# Patient Record
Sex: MALE | Race: ASIAN | NOT HISPANIC OR LATINO | Employment: FULL TIME | URBAN - METROPOLITAN AREA
[De-identification: names, ages, dates, MRNs, and addresses within clinical notes are randomized per-mention and may not be internally consistent; named-entity substitution may affect disease eponyms.]

---

## 2022-07-25 ENCOUNTER — LAB (OUTPATIENT)
Dept: LAB | Facility: CLINIC | Age: 37
End: 2022-07-25
Payer: COMMERCIAL

## 2022-07-25 DIAGNOSIS — Z20.822 ENCOUNTER FOR LABORATORY TESTING FOR COVID-19 VIRUS: ICD-10-CM

## 2022-07-25 PROCEDURE — U0003 INFECTIOUS AGENT DETECTION BY NUCLEIC ACID (DNA OR RNA); SEVERE ACUTE RESPIRATORY SYNDROME CORONAVIRUS 2 (SARS-COV-2) (CORONAVIRUS DISEASE [COVID-19]), AMPLIFIED PROBE TECHNIQUE, MAKING USE OF HIGH THROUGHPUT TECHNOLOGIES AS DESCRIBED BY CMS-2020-01-R: HCPCS

## 2022-07-25 PROCEDURE — U0005 INFEC AGEN DETEC AMPLI PROBE: HCPCS

## 2022-07-26 LAB — SARS-COV-2 RNA RESP QL NAA+PROBE: NEGATIVE

## 2022-10-03 ENCOUNTER — HEALTH MAINTENANCE LETTER (OUTPATIENT)
Age: 37
End: 2022-10-03

## 2023-09-11 ASSESSMENT — ENCOUNTER SYMPTOMS
COUGH: 0
EYE PAIN: 0
PARESTHESIAS: 1
CONSTIPATION: 1
PALPITATIONS: 0
DIZZINESS: 0
DIARRHEA: 0
WEAKNESS: 0
HEADACHES: 0
HEARTBURN: 0
NAUSEA: 0
CHILLS: 0
FEVER: 0
MYALGIAS: 1
SORE THROAT: 0
SHORTNESS OF BREATH: 0
ARTHRALGIAS: 0
JOINT SWELLING: 0
DYSURIA: 0
HEMATURIA: 0
NERVOUS/ANXIOUS: 0
ABDOMINAL PAIN: 0
HEMATOCHEZIA: 0
FREQUENCY: 0

## 2023-09-12 ENCOUNTER — TELEPHONE (OUTPATIENT)
Dept: DERMATOLOGY | Facility: CLINIC | Age: 38
End: 2023-09-12

## 2023-09-12 ENCOUNTER — OFFICE VISIT (OUTPATIENT)
Dept: FAMILY MEDICINE | Facility: CLINIC | Age: 38
End: 2023-09-12
Payer: COMMERCIAL

## 2023-09-12 VITALS
RESPIRATION RATE: 16 BRPM | DIASTOLIC BLOOD PRESSURE: 79 MMHG | WEIGHT: 154 LBS | HEIGHT: 65 IN | SYSTOLIC BLOOD PRESSURE: 117 MMHG | BODY MASS INDEX: 25.66 KG/M2 | HEART RATE: 54 BPM | TEMPERATURE: 98.2 F | OXYGEN SATURATION: 99 %

## 2023-09-12 DIAGNOSIS — Z11.4 SCREENING FOR HIV (HUMAN IMMUNODEFICIENCY VIRUS): ICD-10-CM

## 2023-09-12 DIAGNOSIS — Z23 NEED FOR VACCINATION: ICD-10-CM

## 2023-09-12 DIAGNOSIS — D22.9 NUMEROUS SKIN MOLES: ICD-10-CM

## 2023-09-12 DIAGNOSIS — Z11.59 NEED FOR HEPATITIS C SCREENING TEST: ICD-10-CM

## 2023-09-12 DIAGNOSIS — D22.9 MULTIPLE ATYPICAL SKIN MOLES: ICD-10-CM

## 2023-09-12 DIAGNOSIS — Z23 NEED FOR HEPATITIS B VACCINATION: ICD-10-CM

## 2023-09-12 DIAGNOSIS — J30.2 SEASONAL ALLERGIC RHINITIS, UNSPECIFIED TRIGGER: ICD-10-CM

## 2023-09-12 DIAGNOSIS — Z00.00 ENCOUNTER FOR ANNUAL PHYSICAL EXAM: Primary | ICD-10-CM

## 2023-09-12 DIAGNOSIS — Z22.7 LTBI (LATENT TUBERCULOSIS INFECTION): ICD-10-CM

## 2023-09-12 DIAGNOSIS — Z01.84 IMMUNITY STATUS TESTING: ICD-10-CM

## 2023-09-12 LAB
ALBUMIN SERPL BCG-MCNC: 4.8 G/DL (ref 3.5–5.2)
ALP SERPL-CCNC: 51 U/L (ref 40–129)
ALT SERPL W P-5'-P-CCNC: 20 U/L (ref 0–70)
ANION GAP SERPL CALCULATED.3IONS-SCNC: 10 MMOL/L (ref 7–15)
AST SERPL W P-5'-P-CCNC: 17 U/L (ref 0–45)
BASOPHILS # BLD AUTO: 0 10E3/UL (ref 0–0.2)
BASOPHILS NFR BLD AUTO: 1 %
BILIRUB DIRECT SERPL-MCNC: 0.25 MG/DL (ref 0–0.3)
BILIRUB SERPL-MCNC: 1.1 MG/DL
BUN SERPL-MCNC: 13.7 MG/DL (ref 6–20)
CALCIUM SERPL-MCNC: 9.6 MG/DL (ref 8.6–10)
CHLORIDE SERPL-SCNC: 103 MMOL/L (ref 98–107)
CHOLEST SERPL-MCNC: 161 MG/DL
CREAT SERPL-MCNC: 0.88 MG/DL (ref 0.67–1.17)
DEPRECATED HCO3 PLAS-SCNC: 27 MMOL/L (ref 22–29)
EGFRCR SERPLBLD CKD-EPI 2021: >90 ML/MIN/1.73M2
EOSINOPHIL # BLD AUTO: 0.2 10E3/UL (ref 0–0.7)
EOSINOPHIL NFR BLD AUTO: 3 %
ERYTHROCYTE [DISTWIDTH] IN BLOOD BY AUTOMATED COUNT: 11.7 % (ref 10–15)
GLUCOSE SERPL-MCNC: 91 MG/DL (ref 70–99)
HCT VFR BLD AUTO: 46.6 % (ref 40–53)
HCV AB SERPL QL IA: NONREACTIVE
HDLC SERPL-MCNC: 45 MG/DL
HGB BLD-MCNC: 15.7 G/DL (ref 13.3–17.7)
HIV 1+2 AB+HIV1 P24 AG SERPL QL IA: NONREACTIVE
IMM GRANULOCYTES # BLD: 0 10E3/UL
IMM GRANULOCYTES NFR BLD: 0 %
LDLC SERPL CALC-MCNC: 100 MG/DL
LYMPHOCYTES # BLD AUTO: 1.9 10E3/UL (ref 0.8–5.3)
LYMPHOCYTES NFR BLD AUTO: 38 %
MCH RBC QN AUTO: 31.6 PG (ref 26.5–33)
MCHC RBC AUTO-ENTMCNC: 33.7 G/DL (ref 31.5–36.5)
MCV RBC AUTO: 94 FL (ref 78–100)
MONOCYTES # BLD AUTO: 0.4 10E3/UL (ref 0–1.3)
MONOCYTES NFR BLD AUTO: 8 %
NEUTROPHILS # BLD AUTO: 2.5 10E3/UL (ref 1.6–8.3)
NEUTROPHILS NFR BLD AUTO: 51 %
NONHDLC SERPL-MCNC: 116 MG/DL
PLATELET # BLD AUTO: 209 10E3/UL (ref 150–450)
POTASSIUM SERPL-SCNC: 4.3 MMOL/L (ref 3.4–5.3)
PROT SERPL-MCNC: 7.4 G/DL (ref 6.4–8.3)
RBC # BLD AUTO: 4.97 10E6/UL (ref 4.4–5.9)
SODIUM SERPL-SCNC: 140 MMOL/L (ref 136–145)
TRIGL SERPL-MCNC: 81 MG/DL
TSH SERPL DL<=0.005 MIU/L-ACNC: 2.09 UIU/ML (ref 0.3–4.2)
WBC # BLD AUTO: 5 10E3/UL (ref 4–11)

## 2023-09-12 PROCEDURE — 99386 PREV VISIT NEW AGE 40-64: CPT | Mod: 25 | Performed by: FAMILY MEDICINE

## 2023-09-12 PROCEDURE — 90471 IMMUNIZATION ADMIN: CPT | Performed by: FAMILY MEDICINE

## 2023-09-12 PROCEDURE — 80053 COMPREHEN METABOLIC PANEL: CPT | Performed by: FAMILY MEDICINE

## 2023-09-12 PROCEDURE — 86706 HEP B SURFACE ANTIBODY: CPT | Performed by: FAMILY MEDICINE

## 2023-09-12 PROCEDURE — 36415 COLL VENOUS BLD VENIPUNCTURE: CPT | Performed by: FAMILY MEDICINE

## 2023-09-12 PROCEDURE — 0121A COVID-19 BIVALENT 12+ (PFIZER): CPT | Performed by: FAMILY MEDICINE

## 2023-09-12 PROCEDURE — 86803 HEPATITIS C AB TEST: CPT | Performed by: FAMILY MEDICINE

## 2023-09-12 PROCEDURE — 80061 LIPID PANEL: CPT | Performed by: FAMILY MEDICINE

## 2023-09-12 PROCEDURE — 99213 OFFICE O/P EST LOW 20 MIN: CPT | Mod: 25 | Performed by: FAMILY MEDICINE

## 2023-09-12 PROCEDURE — 82248 BILIRUBIN DIRECT: CPT | Performed by: FAMILY MEDICINE

## 2023-09-12 PROCEDURE — 90714 TD VACC NO PRESV 7 YRS+ IM: CPT | Performed by: FAMILY MEDICINE

## 2023-09-12 PROCEDURE — 90686 IIV4 VACC NO PRSV 0.5 ML IM: CPT | Performed by: FAMILY MEDICINE

## 2023-09-12 PROCEDURE — 91312 COVID-19 BIVALENT 12+ (PFIZER): CPT | Performed by: FAMILY MEDICINE

## 2023-09-12 PROCEDURE — 85025 COMPLETE CBC W/AUTO DIFF WBC: CPT | Performed by: FAMILY MEDICINE

## 2023-09-12 PROCEDURE — 87389 HIV-1 AG W/HIV-1&-2 AB AG IA: CPT | Performed by: FAMILY MEDICINE

## 2023-09-12 PROCEDURE — 84443 ASSAY THYROID STIM HORMONE: CPT | Performed by: FAMILY MEDICINE

## 2023-09-12 PROCEDURE — 90472 IMMUNIZATION ADMIN EACH ADD: CPT | Performed by: FAMILY MEDICINE

## 2023-09-12 RX ORDER — FLUTICASONE PROPIONATE 50 MCG
1 SPRAY, SUSPENSION (ML) NASAL DAILY PRN
Qty: 9.9 ML | Refills: 11 | Status: SHIPPED | OUTPATIENT
Start: 2023-09-12

## 2023-09-12 RX ORDER — LATANOPROST 50 UG/ML
1 SOLUTION/ DROPS OPHTHALMIC DAILY
COMMUNITY
Start: 2015-09-10

## 2023-09-12 ASSESSMENT — ENCOUNTER SYMPTOMS
HEARTBURN: 0
ABDOMINAL PAIN: 0
PARESTHESIAS: 1
WEAKNESS: 0
MYALGIAS: 1
SORE THROAT: 0
NERVOUS/ANXIOUS: 0
COUGH: 0
DIARRHEA: 0
DIZZINESS: 0
NAUSEA: 0
PALPITATIONS: 0
FEVER: 0
FREQUENCY: 0
ARTHRALGIAS: 0
HEMATOCHEZIA: 0
CHILLS: 0
EYE PAIN: 0
DYSURIA: 0
CONSTIPATION: 1
SHORTNESS OF BREATH: 0
HEADACHES: 0
HEMATURIA: 0
JOINT SWELLING: 0

## 2023-09-12 NOTE — TELEPHONE ENCOUNTER
This encounter is being sent to inform the clinic that this patient has a referral from Arnol Chen MD in [facility]/OB  for the diagnoses of Numerous skin moles, Multiple atypical skin moles and has requested that this patient be seen within ASAP. Based on the availability of our provider(s), we are unable to accommodate this request.    Were all sites offered this patient?  Yes  Does scheduling algorithm request to schedule next available?  Patient has been scheduled for the first available opening with Axel Manuel MD on 4/9/24.  We have informed the patient that the clinic will review their referral and reach out if a sooner appointment is medically necessary.

## 2023-09-12 NOTE — PROGRESS NOTES
SUBJECTIVE:   CC: Alexander is an 38 year old who presents for preventative health visit.       9/12/2023     9:50 AM   Additional Questions   Roomed by Simin MARTINI         9/12/2023     9:50 AM   Patient Reported Additional Medications   Patient reports taking the following new medications Xalantan Eye drops       Healthy Habits:     Getting at least 3 servings of Calcium per day:  Yes    Bi-annual eye exam:  Yes    Dental care twice a year:  Yes    Sleep apnea or symptoms of sleep apnea:  None    Diet:  Regular (no restrictions)    Frequency of exercise:  1 day/week    Duration of exercise:  15-30 minutes    Taking medications regularly:  Yes    Medication side effects:  None    Additional concerns today:  Yes      Today's PHQ-2 Score:       9/11/2023     2:28 PM   PHQ-2 ( 1999 Pfizer)   Q1: Little interest or pleasure in doing things 0   Q2: Feeling down, depressed or hopeless 0   PHQ-2 Score 0   Q1: Little interest or pleasure in doing things Not at all   Q2: Feeling down, depressed or hopeless Not at all   PHQ-2 Score 0       He moved to [place] from [place] 3 years ago, but hasn't seen anyone since. So he decided to come in to get checked. Some of his friends became sick and so he wants to get checked. The friend got cancer in the neck.     He just came back from [place]. He is wondering about TB testing. Some of his family members in [place] had been diagnosed with TB. He had a positive TB test in 2017 and was treated with two medications for 6 months. He was not having symptoms at that time. Discussed monitoring for symptoms, CXR.  Plan to monitor at this time.     His wife has a cough that started a week ago. Wife noted to have LTBI in the past as well.     Moles: noted on the arm and body. New lesions noted, though are small, mostly. No pain, redness, swelling, or other changes noted.     Chest pain: he notes this sometimes when he wakes up or turns wrong. The pain is in the middle of the chest. The pain lasts for  several seconds to 1-2 minutes and then he feels better. No pain with exertion noted. He experiences the pain 1-2 times a month. No patterns noted with activities/diet before the chest pain comes on.     Allergic rhinitis: noted during the spring/fall with season changes. Used zyrtec in the past and didn't help a lot. He had allergy testing when was in his 20's.     Itching noted in the groin. Mild redness. No pain. Groin can be moist and hot. Discussed conservative management with hygiene and drying agents.     -Reviewed healthy eating and exercise.  -Skin cancer screening: No concerning skin changes expressed by patient.  -Smoking status:   Tobacco Use      Smoking status: Never      Smokeless tobacco: Never      -Family history: CAD, DM: none  HTN, atherosclerosis: father (66 years old now)  Colon cancer: PGM (diagnosed at age 73 years)    Family History   Problem Relation Age of Onset    Hypertension Father     Other - See Comments Father         Atherosclerosis    Colon Cancer Paternal Grandfather 75       Preventative health recommendations, evaluation options, and risk/benefits of each were discussed with patient. Accepted recommendations were ordered. Otherwise, patient declined.  Health Maintenance Due   Topic Date Due    YEARLY PREVENTIVE VISIT  Never done    ANNUAL REVIEW OF HM ORDERS  Never done    ADVANCE CARE PLANNING  Never done    HEPATITIS B IMMUNIZATION (1 of 3 - 3-dose series) Never done    HIV SCREENING  Never done    HEPATITIS C SCREENING  Never done    DTAP/TDAP/TD IMMUNIZATION (1 - Tdap) Never done    LIPID  Never done    COVID-19 Vaccine (4 - Moderna series) 02/01/2022    INFLUENZA VACCINE (1) Never done     Last Td booster: uncertain. Likely more than 5-10 years ago. No other records avaiable for review. Reviewed general recommendations, risks, benefits. Plan for Td booster today.   Hep B: uncertain. Reviewed immunity testing.   He gets flu shot annually.     -Immunizations due were  reviewed.    Immunization History   Administered Date(s) Administered    COVID-19 Bivalent 12+ (Pfizer) 09/12/2023    COVID-19 MONOVALENT 12+ (Pfizer) 12/07/2021    COVID-19 Monovalent 18+ (Moderna) 04/20/2021, 05/18/2021    Influenza Vaccine >6 months (Alfuria,Fluzone) 09/12/2023    TD,PF 7+ (Tenivac) 09/12/2023       -Labs: Laboratory recommendations reviewed with patient.            Social History     Tobacco Use    Smoking status: Never     Passive exposure: Never    Smokeless tobacco: Never   Substance Use Topics    Alcohol use: Not on file           9/11/2023     2:28 PM   Alcohol Use   Prescreen: >3 drinks/day or >7 drinks/week? No          No data to display                Last PSA: No results found for: PSA    Reviewed orders with patient. Reviewed health maintenance and updated orders accordingly - Yes    Reviewed and updated as needed this visit by clinical staff   Tobacco  Allergies  Meds     [facility]          Reviewed and updated as needed this visit by Provider         Jamal Frost         No past medical history on file.   No past surgical history on file.  No previous surgery.       Review of Systems   Constitutional:  Negative for chills and fever.   HENT:  Negative for congestion, ear pain, hearing loss and sore throat.    Eyes:  Negative for pain and visual disturbance.   Respiratory:  Negative for cough and shortness of breath.    Cardiovascular:  Positive for chest pain. Negative for palpitations and peripheral edema.   Gastrointestinal:  Positive for constipation. Negative for abdominal pain, diarrhea, heartburn, hematochezia and nausea.   Genitourinary:  Negative for dysuria, frequency, genital sores, hematuria, impotence, penile discharge and urgency.   Musculoskeletal:  Positive for myalgias. Negative for arthralgias and joint swelling.   Skin:  Negative for rash.   Neurological:  Positive for paresthesias. Negative for dizziness, weakness and headaches.   Psychiatric/Behavioral:  Negative for  "mood changes. The patient is not nervous/anxious.        OBJECTIVE:   /79   Pulse 54   Temp 98.2  F (36.8  C) (Oral)   Resp 16   Ht 1.638 m (5' 4.5\")   Wt 69.9 kg (154 lb)   SpO2 99%   BMI 26.03 kg/m      Physical Exam  GENERAL: healthy, alert and no distress  EYES: Eyes grossly normal to inspection, PERRL and conjunctivae and sclerae normal  HENT: ear canals and TM's normal, nose and mouth without ulcers or lesions  NECK: no adenopathy, no asymmetry, masses, or scars and thyroid normal to palpation  RESP: lungs clear to auscultation - no rales, rhonchi or wheezes  CV: regular rate and rhythm, normal S1 S2, no S3 or S4, no murmur, click or rub, no peripheral edema and peripheral pulses strong  ABDOMEN: soft, nontender, no hepatosplenomegaly, no masses and bowel sounds normal  : mild erythema of the groin/perineal regions. Otherwise normal skin.   MS: no gross musculoskeletal defects noted, no edema  SKIN: Multiple nevi on arms, body. Largest lesions are on the right arm and is about 1.5cm in diameter with hair growing out of it. Otherwise, no suspicious lesions or rashes  NEURO: Normal strength and tone, mentation intact and speech normal  PSYCH: mentation appears normal, affect normal/bright    Diagnostic Test Results:  Labs reviewed in Epic    ASSESSMENT/PLAN:     Patient Instructions:    -You are doing great.  -Continue to eat well.  Try to increase your servings of calcium as this can help your bones stay strong and healthy.  Follow a nutrition plan patient fruits and vegetables and low in fats and cholesterol.    -Be sure to eat 5-7 servings of fruits and vegetables each day.  -Find ways to stay active.  Try to get 150 minutes of moderate activity (where you are breathing faster and slightly sweating) each week.  -Try to maintain a body mass index (BMI) of 18.5-25 as this is considered a healthier weight range.  -Brush your teeth twice daily.  See a dentist every 6-12 months.  -Be sure to use " sunblock with SPF 15 or greater when going outside for extended periods of time.  Sunblock should be used even when it is a cloudy day.  Do intermittent skin checks for any concerning skin changes.  Wearing a wide brimmed hat and sunglasses can also be helpful to protect your skin from the sun.  -Monitor for any abnormal skin changes (such as new moles/spots, painful moles, changes in your old moles, wounds that will not heal, multiple colors noted in one lesion, lesions that are asymmetric or not circular, or anything that is concerning for you). If any of these are noted, please schedule an appointment to be seen.     -It is generally recommended for you to complete a health care directive or living will. These documents will be able to reflect your wishes and desire in the case that you are unable to express them yourself. Please let Dr. Chen know if you would like some assistance with this process.  -Review the packet that was given to you and complete it when you are ready.  Please bring a copy to the clinic to be scanned into the chart when you are ready.    -You were referred to the skin specialist for the moles.  -If you do not hear from the specialist to schedule an appointment within a week's time from today, please call the [facility] and speak with the specialty  to help you schedule the appointment to see the specialist.  Depending on the specialist availability, it may be a number of weeks prior to your scheduled appointment.    -Use of Flonase for allergies.  -Nasal spray medications: when using the nasal spray medication, put the tip of the medication bottle in about 1/2 inch into the nose, turn the tip so that it is pointing towards the outer eyebrow on that same side, and then spray. You do not have to sniff or do anything special. If you get a taste of the medication in the back of your throat right away, then you need to turn the tip of the bottle more outwards.     -If the chest  pain becomes more intense or more persistent or if noted during exertion/physical activity, please seek medical attention.    Please seek immediate medical attention (go to the emergency room or urgent care) for the following reasons: worsening symptoms, or any concerning changes.    Please return to clinic in 1 year for an annual physical, or sooner as needed.      Alexander was seen today for physical, mole, pulmonary function testing and heart problem.  Diagnoses and all orders for this visit:    Encounter for annual physical exam  LTBI: Discussed monitoring for TB symptoms.Treated. Asymptomatic at this time.   -     Lipid panel reflex to direct LDL Non-fasting; Future  -     Basic metabolic panel; Future  -     CBC with Platelets & Differential; Future  -     Hepatic function panel; Future  -     TSH with free T4 reflex; Future    Need for vaccination  -     INFLUENZA VACCINE IM > 6 MONTHS VALENT IIV4 (AFLURIA/FLUZONE)  -     TD,PF 7+(TENIVAC)  -     COVID-19 BIVALENT 12+ (PFIZER)    Screening for HIV (human immunodeficiency virus)  -     HIV Antigen Antibody Combo; Future    Need for hepatitis C screening test  -     Hepatitis C Screen Reflex to HCV RNA Quant and Genotype; Future    Immunity status testing  -     Hepatitis B Surface Antibody; Future    Numerous skin moles  Multiple atypical skin moles: plan as below.   -     Adult Dermatology Referral; Future    Seasonal allergic rhinitis, unspecified trigger: trial of medication as below.   -     fluticasone (FLONASE) 50 MCG/ACT nasal spray; Spray 1 spray into both nostrils daily as needed for rhinitis or allergies    Other orders  -     REVIEW OF HEALTH MAINTENANCE PROTOCOL ORDERS    Need for hepatitis B vaccination: noted on immunity testing. Plan as below per CDC/USPSTF guidelines.   -     HEPATITIS B, ADULT (ENGERIX-B/RECOMBIVAX HB); Future  -     HEPATITIS B, ADULT (ENGERIX-B/RECOMBIVAX HB); Future  -     HEPATITIS B, ADULT (ENGERIX-B/RECOMBIVAX HB);  "Future        Patient has been advised of split billing requirements and indicates understanding: Yes (by nurse)      COUNSELING:   Reviewed preventive health recommendations.    BMI:   Estimated body mass index is 26.03 kg/m  as calculated from the following:    Height as of this encounter: 1.638 m (5' 4.5\").    Weight as of this encounter: 69.9 kg (154 lb).       He reports that he has never smoked. He has never been exposed to tobacco smoke. He has never used smokeless tobacco.      Prior to immunization administration, verified patients identity using patient s name and date of birth. Please see Immunization Activity for additional information.     Screening Questionnaire for Adult Immunization    Are you sick today?   No   Do you have allergies to medications, food, a vaccine component or latex?   No   Have you ever had a serious reaction after receiving a vaccination?   No   Do you have a long-term health problem with heart, lung, kidney, or metabolic disease (e.g., diabetes), asthma, a blood disorder, no spleen, complement component deficiency, a cochlear implant, or a spinal fluid leak?  Are you on long-term aspirin therapy?   No   Do you have cancer, leukemia, HIV/AIDS, or any other immune system problem?   No   Do you have a parent, brother, or sister with an immune system problem?   No   In the past 3 months, have you taken medications that affect  your immune system, such as prednisone, other steroids, or anticancer drugs; drugs for the treatment of rheumatoid arthritis, Crohn s disease, or psoriasis; or have you had radiation treatments?   No   Have you had a seizure, or a brain or other nervous system problem?   No   During the past year, have you received a transfusion of blood or blood    products, or been given immune (gamma) globulin or antiviral drug?   No   For women: Are you pregnant or is there a chance you could become       pregnant during the next month?   No   Have you received any " vaccinations in the past 4 weeks?   No     Immunization questionnaire answers were all negative.      Patient instructed to remain in clinic for 15 minutes afterwards, and to report any adverse reactions.     Screening performed by Raiza De León on 9/12/2023 at 11:12 AM.       Arnol Chen MD  [facility] [place] [address]  Phone: [phone]  Fax: [phone]    9/15/2023  6:18 AM

## 2023-09-12 NOTE — PATIENT INSTRUCTIONS
-Thank you for choosing the [facility].  -It was a pleasure to see you today.  -Please take a look at the information below for more specific details regarding the treatment plan and recommendations.  -In this after visit summary is a list of your medications and specific instructions.  Please review this carefully as there may be changes made to your medication list.  -If there are any particular questions or concerns, please feel free to reach out to Dr. Chen.  -If any labs have been completed, we will reach out to you about results.  If the results are normal or not concerning, a letter or MyChart message will be sent to you.  If any follow-up is needed, either Dr. Chen or the nurse will give you a call.  If you have not heard regarding results after 2 weeks, please reach out to the clinic.    Patient Instructions:    -You are doing great.  -Continue to eat well.  Try to increase your servings of calcium as this can help your bones stay strong and healthy.  Follow a nutrition plan patient fruits and vegetables and low in fats and cholesterol.    -Be sure to eat 5-7 servings of fruits and vegetables each day.  -Find ways to stay active.  Try to get 150 minutes of moderate activity (where you are breathing faster and slightly sweating) each week.  -Try to maintain a body mass index (BMI) of 18.5-25 as this is considered a healthier weight range.  -Brush your teeth twice daily.  See a dentist every 6-12 months.  -Be sure to use sunblock with SPF 15 or greater when going outside for extended periods of time.  Sunblock should be used even when it is a cloudy day.  Do intermittent skin checks for any concerning skin changes.  Wearing a wide brimmed hat and sunglasses can also be helpful to protect your skin from the sun.  -Monitor for any abnormal skin changes (such as new moles/spots, painful moles, changes in your old moles, wounds that will not heal, multiple colors noted in one lesion,  lesions that are asymmetric or not circular, or anything that is concerning for you). If any of these are noted, please schedule an appointment to be seen.     -It is generally recommended for you to complete a health care directive or living will. These documents will be able to reflect your wishes and desire in the case that you are unable to express them yourself. Please let Dr. Chen know if you would like some assistance with this process.  -Review the packet that was given to you and complete it when you are ready.  Please bring a copy to the clinic to be scanned into the chart when you are ready.    -You were referred to the skin specialist for the moles.  -If you do not hear from the specialist to schedule an appointment within a week's time from today, please call the [facility] and speak with the specialty  to help you schedule the appointment to see the specialist.  Depending on the specialist availability, it may be a number of weeks prior to your scheduled appointment.    -Use of Flonase for allergies.  -Nasal spray medications: when using the nasal spray medication, put the tip of the medication bottle in about 1/2 inch into the nose, turn the tip so that it is pointing towards the outer eyebrow on that same side, and then spray. You do not have to sniff or do anything special. If you get a taste of the medication in the back of your throat right away, then you need to turn the tip of the bottle more outwards.     -If the chest pain becomes more intense or more persistent or if noted during exertion/physical activity, please seek medical attention.    Please seek immediate medical attention (go to the emergency room or urgent care) for the following reasons: worsening symptoms, or any concerning changes.    Please return to clinic in 1 year for an annual physical, or sooner as  needed.      --------------------------------------------------------------------------------------------------------------------    -We are always looking for ways to improve.  You may be selected to receive a survey regarding your visit today.  We encourage you to complete the survey and provide specific, constructive feedback to help us improve our processes.  Thank you for your time!  -Please review the contact information listed on the after visit summary and in the electronic chart.  Below is the phone number that we have on file.  If there are any changes that are needed to be made, please reach out to the clinic.  [phone] (home)

## 2023-09-13 LAB
HBV SURFACE AB SERPL IA-ACNC: 8.5 M[IU]/ML
HBV SURFACE AB SERPL IA-ACNC: NORMAL M[IU]/ML

## 2023-09-14 PROBLEM — D22.9 NUMEROUS SKIN MOLES: Status: ACTIVE | Noted: 2023-09-14

## 2023-09-14 PROBLEM — Z23 NEED FOR HEPATITIS B VACCINATION: Status: ACTIVE | Noted: 2023-09-14

## 2023-09-14 PROBLEM — J30.2 SEASONAL ALLERGIC RHINITIS, UNSPECIFIED TRIGGER: Status: ACTIVE | Noted: 2023-09-14

## 2023-09-15 ENCOUNTER — TELEPHONE (OUTPATIENT)
Dept: DERMATOLOGY | Facility: CLINIC | Age: 38
End: 2023-09-15

## 2023-09-15 PROBLEM — Z22.7 LTBI (LATENT TUBERCULOSIS INFECTION): Status: ACTIVE | Noted: 2023-09-15

## 2023-09-15 NOTE — TELEPHONE ENCOUNTER
Patient Contacted  and schedule the following:    Appointment type: New  Provider: Dr. Lambert  Return date: 1/04/24  Specialty phone number: [phone]

## 2024-02-13 DIAGNOSIS — R05.3 CHRONIC COUGH: Primary | ICD-10-CM

## 2024-03-07 ENCOUNTER — OFFICE VISIT (OUTPATIENT)
Dept: DERMATOLOGY | Facility: CLINIC | Age: 39
End: 2024-03-07
Attending: FAMILY MEDICINE
Payer: COMMERCIAL

## 2024-03-07 DIAGNOSIS — L30.4 INTERTRIGO: Primary | ICD-10-CM

## 2024-03-07 DIAGNOSIS — L20.89 OTHER ATOPIC DERMATITIS: ICD-10-CM

## 2024-03-07 DIAGNOSIS — D22.9 NUMEROUS SKIN MOLES: ICD-10-CM

## 2024-03-07 DIAGNOSIS — D22.9 MULTIPLE ATYPICAL SKIN MOLES: ICD-10-CM

## 2024-03-07 PROCEDURE — 99204 OFFICE O/P NEW MOD 45 MIN: CPT | Performed by: STUDENT IN AN ORGANIZED HEALTH CARE EDUCATION/TRAINING PROGRAM

## 2024-03-07 RX ORDER — TACROLIMUS 1 MG/G
OINTMENT TOPICAL 2 TIMES DAILY
Qty: 100 G | Refills: 3 | Status: SHIPPED | OUTPATIENT
Start: 2024-03-07

## 2024-03-07 NOTE — PATIENT INSTRUCTIONS
Your rash is called intertrigo, it is caused by increased moisture and friction  - try using drying powders like gold bond drying powder  - try using tight fiting spandex then sit in the crease of your groin to reduce the friction between your scrotum and thigh  - you can apply protopic ointment twice daily to reduce inflammation and itching

## 2024-03-07 NOTE — PROGRESS NOTES
I have personally examined this patient and agree with the medical student's documentation and plan of care. I have reviewed and amended the medical student's note as necessary.The documentation accurately reflects my clinical observations, diagnoses, treatment and follow-up plans.     Eder Landeros MD  Dermatology Staff      [facility] Dermatology Note  Encounter Date: Mar 7, 2024  Office Visit     Dermatology Problem List:  1. FBSE 3/7/24 with benign nevi and cherry angiomas   2. Intertrigo, inner thigh and groin   ____________________________________________    Assessment & Plan:    # FBSE 3/7/24 with benign nevi and cherry angiomas   Multiple benign nevi scattered throughout chest, torso, face, and arms. Larger benign nevi on R arm without concerning features.   - ABCDEs: Counseled ABCDEs of melanoma: Asymmetry, Border (irregularity), Color (not uniform, changes in color), Diameter (greater than 6 mm which is about the size of a pencil eraser), and Evolving (any changes in preexisting moles).  - Sun protection: Counseled SPF30+ sunscreen, UPF clothing, sun avoidance, tanning bed avoidance.     #Intertrigo, inner thigh and groin   Chronic rash likely from repeated friction and moisture.   - Gold bond drying powders   - Tight fitting spandex   - Tacrolimus 0.1% ointment twice daily in affected region     Procedures Performed:   None    Staff and Medical Student:     Luciana Davidson, MS3    ____________________________________________    CC: Derm Problem (Pt has concern for black moles on left shoulder and trunk; pt states they have been present for ~2 years but have started growing bigger. Along with prutuis in inguinal area)    HPI:  Mr. Alexander Jacobs is a(n) 39 year old male who presents today as a new patient for skin check after new moles were found at his PCP visit. He has had the larger moles on his R arm since he was young and has not noticed any growth or changes. He is concerned about the  new, smaller moles on his back and torso. Has also been experiencing itching in his groin area that has not resolved with lotions and changing his clothes frequently. Patient is otherwise feeling well, without additional skin concerns.    Labs:  None reviewed.    Physical Exam:  Vitals: There were no vitals taken for this visit.  SKIN: Full skin, which includes the head/face, both arms, chest, back, abdomen,both legs, genitalia and/or groin buttocks, digits and/or nails, was examined.  - Multiple symmetric dark brown-pigmented nevi <0.5 mm throughout the torso, back, arms, and face    - Multiple small red nodules scattered throughout torso  - Largest nevi on R arm 1 cm and 1.5 cm, well-defined, symmetric, even color with hair growth   - Mild erythema of groin region   - No other lesions of concern on areas examined.     Medications:  Current Outpatient Medications   Medication    fluticasone (FLONASE) 50 MCG/ACT nasal spray    latanoprost (XALATAN) 0.005 % ophthalmic solution     No current facility-administered medications for this visit.      Past Medical History:   Patient Active Problem List   Diagnosis    Need for hepatitis B vaccination    Numerous skin moles    Seasonal allergic rhinitis, unspecified trigger    LTBI (latent tuberculosis infection)     History reviewed. No pertinent past medical history.     CC Arnol Chen MD  [address] [place] [phone] on close of this encounter.

## 2024-03-07 NOTE — PROGRESS NOTES
Dermatology Rooming Note    Alexanedr Jacobs's goals for this visit include:   Chief Complaint   Patient presents with    Derm Problem     Pt has concern for black moles on left shoulder and trunk; pt states they have been present for ~2 years but have started growing bigger. Along with prutuis in inguinal area     Chi Marrero, EMT-B

## 2024-03-07 NOTE — LETTER
3/7/2024       RE: Alexander Jacobs  [address]  [place] [address]     Dear Colleague,    Thank you for referring your patient, Alexander Jacobs, to the [facility] DERMATOLOGY [facility] [place] at [facility]. Please see a copy of my visit note below.    I have personally examined this patient and agree with the medical student's documentation and plan of care. I have reviewed and amended the medical student's note as necessary.The documentation accurately reflects my clinical observations, diagnoses, treatment and follow-up plans.     Eder Landeros MD  Dermatology Staff      [facility] Dermatology Note  Encounter Date: Mar 7, 2024  Office Visit     Dermatology Problem List:  1. FBSE 3/7/24 with benign nevi and cherry angiomas   2. Intertrigo, inner thigh and groin   ____________________________________________    Assessment & Plan:    # FBSE 3/7/24 with benign nevi and cherry angiomas   Multiple benign nevi scattered throughout chest, torso, face, and arms. Larger benign nevi on R arm without concerning features.   - ABCDEs: Counseled ABCDEs of melanoma: Asymmetry, Border (irregularity), Color (not uniform, changes in color), Diameter (greater than 6 mm which is about the size of a pencil eraser), and Evolving (any changes in preexisting moles).  - Sun protection: Counseled SPF30+ sunscreen, UPF clothing, sun avoidance, tanning bed avoidance.     #Intertrigo, inner thigh and groin   Chronic rash likely from repeated friction and moisture.   - Gold bond drying powders   - Tight fitting spandex   - Tacrolimus 0.1% ointment twice daily in affected region     Procedures Performed:   None    Staff and Medical Student:     Luciana Davidson, MS3    ____________________________________________    CC: Derm Problem (Pt has concern for black moles on left shoulder and trunk; pt states they have been present for ~2 years but have started growing bigger. Along  with prutuis in inguinal area)    HPI:  Mr. Alexander Jacobs is a(n) 39 year old male who presents today as a new patient for skin check after new moles were found at his PCP visit. He has had the larger moles on his R arm since he was young and has not noticed any growth or changes. He is concerned about the new, smaller moles on his back and torso. Has also been experiencing itching in his groin area that has not resolved with lotions and changing his clothes frequently. Patient is otherwise feeling well, without additional skin concerns.    Labs:  None reviewed.    Physical Exam:  Vitals: There were no vitals taken for this visit.  SKIN: Full skin, which includes the head/face, both arms, chest, back, abdomen,both legs, genitalia and/or groin buttocks, digits and/or nails, was examined.  - Multiple symmetric dark brown-pigmented nevi <0.5 mm throughout the torso, back, arms, and face    - Multiple small red nodules scattered throughout torso  - Largest nevi on R arm 1 cm and 1.5 cm, well-defined, symmetric, even color with hair growth   - Mild erythema of groin region   - No other lesions of concern on areas examined.     Medications:  Current Outpatient Medications   Medication    fluticasone (FLONASE) 50 MCG/ACT nasal spray    latanoprost (XALATAN) 0.005 % ophthalmic solution     No current facility-administered medications for this visit.      Past Medical History:   Patient Active Problem List   Diagnosis    Need for hepatitis B vaccination    Numerous skin moles    Seasonal allergic rhinitis, unspecified trigger    LTBI (latent tuberculosis infection)     History reviewed. No pertinent past medical history.     CC Arnol Chen MD  [address] [place] [phone] on close of this encounter.     Dermatology Rooming Note    Alexander Jacobs's goals for this visit include:   Chief Complaint   Patient presents with    Derm Problem     Pt has concern for black moles on left shoulder and trunk; pt states they have  been present for ~2 years but have started growing bigger. Along with prrosalind in inguinal area     Chi Marrero, EMT-B

## 2024-03-07 NOTE — NURSING NOTE
Dermatology Rooming Note    Alexander Jacobs's goals for this visit include:   Chief Complaint   Patient presents with    Derm Problem     Pt has concern for black moles on left shoulder and trunk; pt states they have been present for ~2 years but have started growing bigger.      Chi Marrero, EMT-B

## 2024-03-14 ENCOUNTER — HOSPITAL ENCOUNTER (OUTPATIENT)
Dept: GENERAL RADIOLOGY | Facility: HOSPITAL | Age: 39
Discharge: HOME | End: 2024-03-14
Attending: INTERNAL MEDICINE | Admitting: INTERNAL MEDICINE
Payer: COMMERCIAL

## 2024-03-14 ENCOUNTER — OFFICE VISIT (OUTPATIENT)
Dept: PULMONOLOGY | Facility: CLINIC | Age: 39
End: 2024-03-14
Payer: COMMERCIAL

## 2024-03-14 VITALS
BODY MASS INDEX: 26.99 KG/M2 | WEIGHT: 162 LBS | OXYGEN SATURATION: 96 % | SYSTOLIC BLOOD PRESSURE: 122 MMHG | HEART RATE: 82 BPM | DIASTOLIC BLOOD PRESSURE: 64 MMHG | HEIGHT: 65 IN

## 2024-03-14 DIAGNOSIS — R05.1 ACUTE COUGH: Primary | ICD-10-CM

## 2024-03-14 DIAGNOSIS — R05.3 CHRONIC COUGH: Primary | ICD-10-CM

## 2024-03-14 DIAGNOSIS — R05.1 ACUTE COUGH: ICD-10-CM

## 2024-03-14 PROCEDURE — 71046 X-RAY EXAM CHEST 2 VIEWS: CPT

## 2024-03-14 PROCEDURE — 99204 OFFICE O/P NEW MOD 45 MIN: CPT | Performed by: INTERNAL MEDICINE

## 2024-03-14 NOTE — PROGRESS NOTES
Pulmonary Clinic Consultation    Assessment:  39yoM with chronic cough following viral illness.        Plan:   CT chest to rule out active infection.  I do not think this is active TB--afebrile no weight loss and clear CT chest.   Proceed with PFTs  Sputum culture and instructions for drop off  Consider ICS/LABA if CT clear.    Noa Messina MD  Pulmonary/Critical Care        ---------------------------------    Reason for Consult: cough    HPI: 39yoM with history of glaucoma presents for evaluation of 3 months of cough. Patient is an immigrant from [place] and was treated for latent TB in 2017 for 6 months after coming to [place].    He denies history of lung disease, no issues as a child. While in [place] in December 2023 had fever and cough for 10 days. He was COVID negative. This resolved but coughing paroxysms have persisted. He still has thick and sticky sputum. He is not wheezing but does report shortness of breath with running 10-15 minutes, whereas before he could run 10 miles.     His concern is Mycoplasma infection which is common in [place].     ROS:  A 12-system review was notable for cough, shortness of breath. The remainder reviewed and negative.     PMH:  Past Medical History:   Diagnosis Date    Glaucoma     Both eyes    Seasonal allergic rhinitis        PSH:  Social History     Tobacco Use    Smoking status: Never     Passive exposure: Never    Smokeless tobacco: Never   Vaping Use    Vaping Use: Never used         Family HX:  Family History   Problem Relation Age of Onset    Autoimmune Disease Father     Hypertension Father     Other - See Comments Father         Atherosclerosis    Colon Cancer Paternal Grandfather 75       Allergies:  No Known Allergies    Current Meds:  Current Outpatient Medications   Medication    fluticasone (FLONASE) 50 MCG/ACT nasal spray    latanoprost (XALATAN) 0.005 % ophthalmic solution    tacrolimus (PROTOPIC) 0.1 % external ointment     No current facility-administered  "medications for this visit.         Physical Exam:  /64 (BP Location: Right arm, Patient Position: Chair, Cuff Size: Adult Regular)   Pulse 82   Ht 1.638 m (5' 4.5\")   Wt 73.5 kg (162 lb)   SpO2 96%   BMI 27.38 kg/m    Gen: alert, oriented, no distress  HEENT: anicteric sclera, mask in place.  Neck: trachea midline, no cervical or supraclavicular lymphadenopathy  CV: Regular rate and rhythm, normal S1 and S2.   Resp: Clear bilaterally with good air entry.   Abd: soft, nontender  Skin: no visible rashes or lesions.   Ext: no cyanosis, clubbing or edema  Neuro: alert, nonfocal, grossly normal.     Labs:  Recent Labs   Lab Test 09/12/23  1120   WBC 5.0   RBC 4.97   HGB 15.7   HCT 46.6   MCV 94   MCH 31.6   MCHC 33.7   RDW 11.7        Results for orders placed or performed in visit on 09/12/23   Basic metabolic panel   Result Value Ref Range    Sodium 140 136 - 145 mmol/L    Potassium 4.3 3.4 - 5.3 mmol/L    Chloride 103 98 - 107 mmol/L    Carbon Dioxide (CO2) 27 22 - 29 mmol/L    Anion Gap 10 7 - 15 mmol/L    Urea Nitrogen 13.7 6.0 - 20.0 mg/dL    Creatinine 0.88 0.67 - 1.17 mg/dL    Calcium 9.6 8.6 - 10.0 mg/dL    Glucose 91 70 - 99 mg/dL    GFR Estimate >90 >60 mL/min/1.73m2           Imaging studies:  Personally reviewed image/s and Personally reviewed impression/s  EXAM: XR CHEST 2 VIEWS  LOCATION: [facility]  DATE: 3/14/2024     INDICATION:  Acute cough  COMPARISON: None.                                                                      IMPRESSION: Negative chest.      PFT's   Pending        "

## 2024-03-29 ENCOUNTER — LAB (OUTPATIENT)
Dept: LAB | Facility: CLINIC | Age: 39
End: 2024-03-29
Payer: COMMERCIAL

## 2024-03-29 DIAGNOSIS — R05.3 CHRONIC COUGH: ICD-10-CM

## 2024-03-29 LAB
BACTERIA SPT CULT: NORMAL
GRAM STAIN RESULT: NORMAL

## 2024-03-29 PROCEDURE — 87205 SMEAR GRAM STAIN: CPT

## 2024-04-01 ENCOUNTER — TELEPHONE (OUTPATIENT)
Dept: PULMONOLOGY | Facility: CLINIC | Age: 39
End: 2024-04-01
Payer: COMMERCIAL

## 2024-04-01 DIAGNOSIS — R05.3 CHRONIC COUGH: Primary | ICD-10-CM

## 2024-04-01 NOTE — TELEPHONE ENCOUNTER
Noa Messina MD Hoops, Dawn, RN  Please let Alexander know his sputum sample was contaminated. He can come  a new specimen cup and resubmit if he would like.    Noa          Called and was unable to reach patient live.  Left detailed VM message with above.  Will put sputum cups at  for him to .

## 2024-12-15 ENCOUNTER — HEALTH MAINTENANCE LETTER (OUTPATIENT)
Age: 39
End: 2024-12-15